# Patient Record
Sex: FEMALE | Race: WHITE | NOT HISPANIC OR LATINO | Employment: OTHER | URBAN - METROPOLITAN AREA
[De-identification: names, ages, dates, MRNs, and addresses within clinical notes are randomized per-mention and may not be internally consistent; named-entity substitution may affect disease eponyms.]

---

## 2019-08-07 LAB
EXTERNAL HEPATITIS B SURFACE ANTIGEN: NEGATIVE
EXTERNAL HEPATITIS C AB: NEGATIVE
EXTERNAL RUBELLA QUALITATIVE: NORMAL
EXTERNAL SYPHILIS RPR SCREEN: NEGATIVE
HIV1 P24 AG SERPL QL IA: NEGATIVE

## 2020-02-06 LAB — EXTERNAL GROUP B STREP ANTIGEN: NEGATIVE

## 2020-03-01 ENCOUNTER — HOSPITAL ENCOUNTER (INPATIENT)
Facility: HOSPITAL | Age: 35
LOS: 2 days | Discharge: HOME | End: 2020-03-03
Attending: OBSTETRICS & GYNECOLOGY | Admitting: OBSTETRICS & GYNECOLOGY

## 2020-03-01 PROBLEM — Z34.90 PREGNANT AND NOT YET DELIVERED: Status: ACTIVE | Noted: 2020-03-01

## 2020-03-01 PROBLEM — Z34.90 PREGNANT AND NOT YET DELIVERED: Status: RESOLVED | Noted: 2020-03-01 | Resolved: 2020-03-01

## 2020-03-01 LAB
ABO GROUP BLD: NORMAL
BLD GP AB SCN SERPL QL: NEGATIVE
DEPRECATED RDW RBC AUTO: 45.1 FL (ref 37–54)
ERYTHROCYTE [DISTWIDTH] IN BLOOD BY AUTOMATED COUNT: 13.9 % (ref 12.3–15.4)
HCT VFR BLD AUTO: 36.7 % (ref 34–46.6)
HGB BLD-MCNC: 12.9 G/DL (ref 12–15.9)
HIV1+2 AB SER QL: NORMAL
MCH RBC QN AUTO: 32.2 PG (ref 26.6–33)
MCHC RBC AUTO-ENTMCNC: 35.1 G/DL (ref 31.5–35.7)
MCV RBC AUTO: 91.7 FL (ref 79–97)
PLATELET # BLD AUTO: 172 10*3/MM3 (ref 140–450)
PMV BLD AUTO: 7.4 FL (ref 6–12)
RBC # BLD AUTO: 4.01 10*6/MM3 (ref 3.77–5.28)
RH BLD: POSITIVE
RPR SER QL: NORMAL
T&S EXPIRATION DATE: NORMAL
WBC NRBC COR # BLD: 11.6 10*3/MM3 (ref 3.4–10.8)

## 2020-03-01 PROCEDURE — 86901 BLOOD TYPING SEROLOGIC RH(D): CPT

## 2020-03-01 PROCEDURE — 86592 SYPHILIS TEST NON-TREP QUAL: CPT | Performed by: OBSTETRICS & GYNECOLOGY

## 2020-03-01 PROCEDURE — 86850 RBC ANTIBODY SCREEN: CPT | Performed by: OBSTETRICS & GYNECOLOGY

## 2020-03-01 PROCEDURE — 85027 COMPLETE CBC AUTOMATED: CPT | Performed by: OBSTETRICS & GYNECOLOGY

## 2020-03-01 PROCEDURE — 25010000003 LIDOCAINE 1 % SOLUTION

## 2020-03-01 PROCEDURE — G0432 EIA HIV-1/HIV-2 SCREEN: HCPCS | Performed by: OBSTETRICS & GYNECOLOGY

## 2020-03-01 PROCEDURE — 86900 BLOOD TYPING SEROLOGIC ABO: CPT

## 2020-03-01 PROCEDURE — 0KQM0ZZ REPAIR PERINEUM MUSCLE, OPEN APPROACH: ICD-10-PCS | Performed by: OBSTETRICS & GYNECOLOGY

## 2020-03-01 PROCEDURE — 86900 BLOOD TYPING SEROLOGIC ABO: CPT | Performed by: OBSTETRICS & GYNECOLOGY

## 2020-03-01 PROCEDURE — 86901 BLOOD TYPING SEROLOGIC RH(D): CPT | Performed by: OBSTETRICS & GYNECOLOGY

## 2020-03-01 PROCEDURE — 4A1HX4Z MONITORING OF PRODUCTS OF CONCEPTION, CARDIAC ELECTRICAL ACTIVITY, EXTERNAL APPROACH: ICD-10-PCS | Performed by: OBSTETRICS & GYNECOLOGY

## 2020-03-01 RX ORDER — SODIUM CHLORIDE, SODIUM LACTATE, POTASSIUM CHLORIDE, CALCIUM CHLORIDE 600; 310; 30; 20 MG/100ML; MG/100ML; MG/100ML; MG/100ML
125 INJECTION, SOLUTION INTRAVENOUS CONTINUOUS
Status: DISCONTINUED | OUTPATIENT
Start: 2020-03-01 | End: 2020-03-01

## 2020-03-01 RX ORDER — METHYLERGONOVINE MALEATE 0.2 MG/ML
200 INJECTION INTRAVENOUS ONCE AS NEEDED
Status: DISCONTINUED | OUTPATIENT
Start: 2020-03-01 | End: 2020-03-03 | Stop reason: HOSPADM

## 2020-03-01 RX ORDER — OXYTOCIN-SODIUM CHLORIDE 0.9% IV SOLN 30 UNIT/500ML 30-0.9/5 UT/ML-%
125 SOLUTION INTRAVENOUS CONTINUOUS PRN
Status: DISCONTINUED | OUTPATIENT
Start: 2020-03-01 | End: 2020-03-01

## 2020-03-01 RX ORDER — FERROUS SULFATE 325(65) MG
325 TABLET ORAL
COMMUNITY

## 2020-03-01 RX ORDER — PRENATAL VIT/IRON FUM/FOLIC AC 27MG-0.8MG
1 TABLET ORAL DAILY
Status: DISCONTINUED | OUTPATIENT
Start: 2020-03-01 | End: 2020-03-03 | Stop reason: HOSPADM

## 2020-03-01 RX ORDER — OXYTOCIN-SODIUM CHLORIDE 0.9% IV SOLN 30 UNIT/500ML 30-0.9/5 UT/ML-%
250 SOLUTION INTRAVENOUS CONTINUOUS
Status: DISCONTINUED | OUTPATIENT
Start: 2020-03-01 | End: 2020-03-01

## 2020-03-01 RX ORDER — SODIUM CHLORIDE 0.9 % (FLUSH) 0.9 %
1-10 SYRINGE (ML) INJECTION AS NEEDED
Status: DISCONTINUED | OUTPATIENT
Start: 2020-03-01 | End: 2020-03-01

## 2020-03-01 RX ORDER — MISOPROSTOL 200 UG/1
800 TABLET ORAL AS NEEDED
Status: DISCONTINUED | OUTPATIENT
Start: 2020-03-01 | End: 2020-03-03 | Stop reason: HOSPADM

## 2020-03-01 RX ORDER — CARBOPROST TROMETHAMINE 250 UG/ML
250 INJECTION, SOLUTION INTRAMUSCULAR AS NEEDED
Status: DISCONTINUED | OUTPATIENT
Start: 2020-03-01 | End: 2020-03-03 | Stop reason: HOSPADM

## 2020-03-01 RX ORDER — MAGNESIUM CARB/ALUMINUM HYDROX 105-160MG
30 TABLET,CHEWABLE ORAL ONCE
Status: DISCONTINUED | OUTPATIENT
Start: 2020-03-01 | End: 2020-03-01 | Stop reason: HOSPADM

## 2020-03-01 RX ORDER — DOCUSATE SODIUM 100 MG/1
100 CAPSULE, LIQUID FILLED ORAL 2 TIMES DAILY
Status: DISCONTINUED | OUTPATIENT
Start: 2020-03-01 | End: 2020-03-01

## 2020-03-01 RX ORDER — DOCUSATE SODIUM 100 MG/1
100 CAPSULE, LIQUID FILLED ORAL 2 TIMES DAILY
Status: DISCONTINUED | OUTPATIENT
Start: 2020-03-01 | End: 2020-03-03 | Stop reason: HOSPADM

## 2020-03-01 RX ORDER — LANOLIN 100 %
OINTMENT (GRAM) TOPICAL
Status: DISCONTINUED | OUTPATIENT
Start: 2020-03-01 | End: 2020-03-03 | Stop reason: HOSPADM

## 2020-03-01 RX ORDER — PRENATAL VIT NO.126/IRON/FOLIC 28MG-0.8MG
TABLET ORAL DAILY
COMMUNITY

## 2020-03-01 RX ORDER — SODIUM CHLORIDE 0.9 % (FLUSH) 0.9 %
10 SYRINGE (ML) INJECTION AS NEEDED
Status: DISCONTINUED | OUTPATIENT
Start: 2020-03-01 | End: 2020-03-01 | Stop reason: HOSPADM

## 2020-03-01 RX ORDER — ONDANSETRON 4 MG/1
4 TABLET, FILM COATED ORAL EVERY 8 HOURS PRN
Status: DISCONTINUED | OUTPATIENT
Start: 2020-03-01 | End: 2020-03-03 | Stop reason: HOSPADM

## 2020-03-01 RX ORDER — OXYTOCIN-SODIUM CHLORIDE 0.9% IV SOLN 30 UNIT/500ML 30-0.9/5 UT/ML-%
999 SOLUTION INTRAVENOUS ONCE
Status: DISCONTINUED | OUTPATIENT
Start: 2020-03-01 | End: 2020-03-01

## 2020-03-01 RX ORDER — IBUPROFEN 600 MG/1
600 TABLET ORAL EVERY 6 HOURS PRN
Status: DISCONTINUED | OUTPATIENT
Start: 2020-03-01 | End: 2020-03-03 | Stop reason: HOSPADM

## 2020-03-01 RX ORDER — LIDOCAINE HYDROCHLORIDE 10 MG/ML
INJECTION, SOLUTION INFILTRATION; PERINEURAL
Status: COMPLETED
Start: 2020-03-01 | End: 2020-03-01

## 2020-03-01 RX ORDER — OXYTOCIN-SODIUM CHLORIDE 0.9% IV SOLN 30 UNIT/500ML 30-0.9/5 UT/ML-%
SOLUTION INTRAVENOUS
Status: COMPLETED
Start: 2020-03-01 | End: 2020-03-01

## 2020-03-01 RX ADMIN — BENZOCAINE 57 SPRAY: 11.4 AEROSOL, SPRAY TOPICAL at 10:15

## 2020-03-01 RX ADMIN — IBUPROFEN 600 MG: 600 TABLET, FILM COATED ORAL at 22:21

## 2020-03-01 RX ADMIN — IBUPROFEN 600 MG: 600 TABLET, FILM COATED ORAL at 10:16

## 2020-03-01 RX ADMIN — OXYTOCIN: 10 INJECTION INTRAVENOUS at 08:48

## 2020-03-01 RX ADMIN — Medication: at 10:16

## 2020-03-01 RX ADMIN — WITCH HAZEL 1 PAD: 500 SOLUTION RECTAL; TOPICAL at 10:16

## 2020-03-01 RX ADMIN — IBUPROFEN 600 MG: 600 TABLET, FILM COATED ORAL at 16:18

## 2020-03-01 RX ADMIN — DOCUSATE SODIUM 100 MG: 100 CAPSULE, LIQUID FILLED ORAL at 20:21

## 2020-03-01 RX ADMIN — LIDOCAINE HYDROCHLORIDE: 10 INJECTION, SOLUTION INFILTRATION; PERINEURAL at 08:50

## 2020-03-01 NOTE — H&P
MAT Roland  Obstetric History and Physical     Chief Complaint: Contractions    Subjective     Patient is a 34 y.o. female  currently at 39w1d , who presents with painful regular contractions.    Her prenatal care is benign.  Her previous obstetric/gynecological history is noted for is non-contributory.      Prenatal Information:  Prenatal Results     POC Urine Glucose/Protein     Test Value Reference Range Date Time    Urine Glucose        Urine Protein              Initial Prenatal Labs     Test Value Reference Range Date Time    Hemoglobin        Hematocrit        Platelets 172 10*3/mm3 140 - 450 20 0632    Rubella IgG Immune   19     Hepatitis B SAg Negative   19     Hepatitis C Ab negative   19     RPR Negative   19     ABO        Rh        Antibody Screen        HIV Negative   19     Urine Culture        Gonorrhea        Chlamydia        TSH              2nd and 3rd Trimester     Test Value Reference Range Date Time    Hemoglobin (repeated) 12.9 g/dL 12.0 - 15.9 20 0632    Hematocrit (repeated) 36.7 % 34.0 - 46.6 20 0632    GCT        Antibody Screen (repeated)        GTT Fasting        GTT 1 Hr        GTT 2 Hr        GTT 3 Hr        Group B Strep Negative   20           Drug Screening     Test Value Reference Range Date Time    Amphetamine Screen        Barbiturate Screen        Benzodiazepine Screen        Methadone Screen        Phencyclidine Screen        Opiates Screen        THC Screen        Cocaine Screen        Propoxyphene Screen        Buprenorphine Screen        Methamphetamine Screen        Oxycodone Screen        Tricyclic Antidepressants Screen              Other (Risk screening)     Test Value Reference Range Date Time    Varicella IgG        Parvovirus IgG        CMV IgG        Cystic Fibrosis        Hemoglobin electrophoresis        NIPT        MSAFP-4        AFP (for NTD only)                  External Prenatal Results     Pregnancy  Outside Results - Transcribed From Office Records - See Scanned Records For Details     Test Value Date Time    Hgb 12.9 g/dL 20 0632    Hct 36.7 % 20 0632    ABO       Rh       Antibody Screen  NEGATIVE  18 1620    Glucose Fasting GTT       Glucose Tolerance Test 1 hour       Glucose Tolerance Test 3 hour       Gonorrhea (discrete)       Chlamydia (discrete)       RPR Negative  19     VDRL       Syphilis Antibody       Rubella Immune  19     HBsAg Negative  19     Herpes Simplex Virus PCR       Herpes Simplex VIrus Culture       HIV Negative  19     Hep C RNA Quant PCR       Hep C Antibody negative  19     AFP       Group B Strep Negative  20     GBS Susceptibility to Clindamycin       GBS Susceptibility to Erythromycin       Fetal Fibronectin       Genetic Testing, Maternal Blood             Drug Screening     Test Value Date Time    Urine Drug Screen       Amphetamine Screen NEGATIVE  18 1530    Barbiturate Screen NEGATIVE  18 1530    Benzodiazepine Screen NEGATIVE  18 1530    Methadone Screen NEGATIVE  18 1530    Phencyclidine Screen NEGATIVE  18 1530    Opiates Screen       THC Screen NEGATIVE  18 1530    Cocaine Screen       Propoxyphene Screen       Buprenorphine Screen       Methamphetamine Screen       Oxycodone Screen       Tricyclic Antidepressants Screen                    Past OB History:       OB History    Para Term  AB Living   2 1 1 0 0 1   SAB TAB Ectopic Molar Multiple Live Births   0 0 0 0 0 1               Past Medical History: No past medical history on file.     Past Surgical History No past surgical history on file.     Family History: No family history on file.   Social History:  has no tobacco history on file.   has no alcohol history on file.   has no drug history on file.        General ROS: Pertinent items are noted in HPI    Objective      Vitals:   There were no vitals filed for this  visit.    Fetal Heart Rate Assessment:   Category 1    [place]:   q 2     Physical Exam:     General Appearance:    Alert, cooperative, in no acute distress   Abdomen:     Soft, non-tender, no guarding, no rebound tenderness,       EFW 7 1/2 #.   Pelvic Exam:    Pelvis adequate.    Presentation: Vertex    Cervix: 7-8/90/-1 per RN   Extremities:   Moves all extremities well, no edema, no cyanosis, no              redness   Skin:   No bleeding, bruising or rash   Neurologic:   No focal neurologic defect          Laboratory Results:   Lab Results (last 48 hours)     Procedure Component Value Units Date/Time    HIV-1 Antibody, EIA [[id number]] Resulted:  08/07/19     Specimen:  Blood Updated:  03/01/20 0656     External HIV Antibody Negative    Group B Streptococcus Culture - Swab, Vaginal/Rectum [[id number]] Resulted:  02/06/20     Specimen:  Swab from Vaginal/Rectum Updated:  03/01/20 0656     External Strep Group B Ag Negative    Hepatitis C Antibody [[id number]] Resulted:  08/07/19     Specimen:  Blood Updated:  03/01/20 0656     External Hepatitis C Ab negative    Hepatitis B Surface Antigen [[id number]] Resulted:  08/07/19     Specimen:  Blood Updated:  03/01/20 0656     External Hepatitis B Surface Ag Negative    RPR [[id number]] Resulted:  08/07/19     Specimen:  Blood Updated:  03/01/20 0656     External RPR Negative    Rubella Antibody, IgG [[id number]] Resulted:  08/07/19     Specimen:  Blood Updated:  03/01/20 0656     External Rubella Qual Immune    CBC (No Diff) [[id number]]  (Abnormal) Collected:  03/01/20 0632    Specimen:  Blood Updated:  03/01/20 0647     WBC 11.60 10*3/mm3      RBC 4.01 10*6/mm3      Hemoglobin 12.9 g/dL      Hematocrit 36.7 %      MCV 91.7 fL      MCH 32.2 pg      MCHC 35.1 g/dL      RDW 13.9 %      RDW-SD 45.1 fl      MPV 7.4 fL      Platelets 172 10*3/mm3     RPR [[id number]] Collected:  03/01/20 0632    Specimen:  Blood Updated:  03/01/20 0643    HIV-1 & HIV-2 Antibodies [[id number]]  Collected:  03/01/20 0632    Specimen:  Blood Updated:  03/01/20 0643    Narrative:       The following orders were created for panel order HIV-1 & HIV-2 Antibodies.  Procedure                               Abnormality         Status                     ---------                               -----------         ------                     HIV-1 / O / 2 Ag / Antib...[[id number]]                      In process                   Please view results for these tests on the individual orders.    HIV-1 / O / 2 Ag / Antibody 4th Generation [[id number]] Collected:  03/01/20 0632    Specimen:  Blood Updated:  03/01/20 0643             Assessment/Plan     Active Problems:    Pregnant and not yet delivered         Assessment:  1.  Intrauterine pregnancy at 39w1d gestation with reactive fetal status.    2.  labor  without ROM   3.  GBS status:   External Strep Group B Ag   Date Value Ref Range Status   02/06/2020 Negative  Final       Plan:  1. Vaginal anticipated, analgesia with  nitrous oxide  2. Plan of care has been reviewed with patient.  3.  Risks, benefits of treatment plan have been discussed.  4.  All questions have been answered.         Sheila Mejia MD   3/1/2020   7:05 AM

## 2020-03-01 NOTE — L&D DELIVERY NOTE
[facility]  Vaginal Delivery Note    Diagnosis     Patient is a 34 y.o. female  currently at 39w1d, who presents with labor.      Delivery     Delivery:  Spontaneous Vaginal Delivery    Date of Delivery:  3/1/2020   Anesthesia: Nitrous   Delivering clinician: Sheila Mejia MD      Delivery narrative:  PT presented in labor at term.  She was 7-8 cm dilated on arrival.  She progressed on a normal labor curve with a reassuring fetal status throughout.  She pushed with excellent effort for just under 30 minutes to deliver a vigorous infant without difficulty.     Infant    Findings: VFI     Apgars: 9&9 at 1 and 5 minutes.      Placenta, Cord, and Fluid    Placenta delivered  spontaneous  3VC     Pitocin 30 U in 500cc bolus given at the time of placenta delivery.       Lacerations       had a 2nd degree laceration, which was repaired. with 3.0 Vicryl rapide in the usual fashion after anesthesia c 10cc 1% lidocaine.    Estimated Blood Loss 300cc     Complications  none    Disposition  Mother to Mother Baby/Postpartum  in stable condition currently.  Baby to remains with mom  in stable condition currently.      Sheila Mejia MD  20  9:21 AM

## 2020-03-02 LAB
BASOPHILS # BLD AUTO: 0 10*3/MM3 (ref 0–0.2)
BASOPHILS NFR BLD AUTO: 0.2 % (ref 0–1.5)
DEPRECATED RDW RBC AUTO: 45.5 FL (ref 37–54)
EOSINOPHIL # BLD AUTO: 0.1 10*3/MM3 (ref 0–0.4)
EOSINOPHIL NFR BLD AUTO: 0.8 % (ref 0.3–6.2)
ERYTHROCYTE [DISTWIDTH] IN BLOOD BY AUTOMATED COUNT: 14 % (ref 12.3–15.4)
HCT VFR BLD AUTO: 34.2 % (ref 34–46.6)
HGB BLD-MCNC: 11.6 G/DL (ref 12–15.9)
LYMPHOCYTES # BLD AUTO: 1.6 10*3/MM3 (ref 0.7–3.1)
LYMPHOCYTES NFR BLD AUTO: 15.8 % (ref 19.6–45.3)
MCH RBC QN AUTO: 31.6 PG (ref 26.6–33)
MCHC RBC AUTO-ENTMCNC: 33.9 G/DL (ref 31.5–35.7)
MCV RBC AUTO: 93.3 FL (ref 79–97)
MONOCYTES # BLD AUTO: 0.6 10*3/MM3 (ref 0.1–0.9)
MONOCYTES NFR BLD AUTO: 6.4 % (ref 5–12)
NEUTROPHILS # BLD AUTO: 7.7 10*3/MM3 (ref 1.7–7)
NEUTROPHILS NFR BLD AUTO: 76.8 % (ref 42.7–76)
NRBC BLD AUTO-RTO: 0 /100 WBC (ref 0–0.2)
PLATELET # BLD AUTO: 184 10*3/MM3 (ref 140–450)
PMV BLD AUTO: 7.4 FL (ref 6–12)
RBC # BLD AUTO: 3.67 10*6/MM3 (ref 3.77–5.28)
WBC NRBC COR # BLD: 10.1 10*3/MM3 (ref 3.4–10.8)

## 2020-03-02 PROCEDURE — 85025 COMPLETE CBC W/AUTO DIFF WBC: CPT | Performed by: OBSTETRICS & GYNECOLOGY

## 2020-03-02 RX ADMIN — DOCUSATE SODIUM 100 MG: 100 CAPSULE, LIQUID FILLED ORAL at 20:54

## 2020-03-02 RX ADMIN — IBUPROFEN 600 MG: 600 TABLET, FILM COATED ORAL at 08:53

## 2020-03-02 RX ADMIN — DOCUSATE SODIUM 100 MG: 100 CAPSULE, LIQUID FILLED ORAL at 08:53

## 2020-03-02 RX ADMIN — IBUPROFEN 600 MG: 600 TABLET, FILM COATED ORAL at 16:01

## 2020-03-02 RX ADMIN — IBUPROFEN 600 MG: 600 TABLET, FILM COATED ORAL at 23:39

## 2020-03-02 NOTE — PROGRESS NOTES
MAT Roland  Postpartum Note    Subjective   Postpartum Day 1:  Spontaneous Vaginal Delivery    Patient without complaints. Her pain is well controlled with nonsteroidal anti-inflammatory drugs. She is ambulating well.  Patient describes her bleeding as thin lochia.    Breastfeeding: without difficulty.    Objective     Vitals:  Vitals:    20 1210 20 1620 20 2250 20 0815   BP: 142/80 122/78 129/78 124/73   BP Location:   Right arm Right arm   Patient Position:   Sitting Sitting   Pulse: 95 95 73 71   Resp: 18 18 18 18   Temp: 98.7 °F (37.1 °C) 98.3 °F (36.8 °C) 98.6 °F (37 °C) 98.6 °F (37 °C)   TempSrc: Oral Oral Oral Oral   SpO2: 98% 98% 98% 99%   Weight:       Height:           Physical Exam:  General:  Alert and oriented x3. No acute distress.  Abdomen: abdomen is soft without significant tenderness, masses, organomegaly or guarding. Fundus: appropriate, firm, non tender  Incision: N/A  Skin: Warm, Dry  Extremities: Normal,  trace edema. Nontender     Labs:  Results from last 7 days   Lab Units 20  0735 20  0632   WBC 10*3/mm3 10.10 11.60*   HEMOGLOBIN g/dL 11.6* 12.9   HEMATOCRIT % 34.2 36.7   PLATELETS 10*3/mm3 184 172            Feeding method: Breastfeeding Status: Yes     Blood Type: RH Positive        Assessment/Plan     Principal Problem:     (normal spontaneous vaginal delivery)      Leslee Michel is Day 1  post-partum from a  Spontaneous Vaginal Delivery      Plan:  routine and continue present management.       LENCHO Kim  3/2/2020  12:35 PM

## 2020-03-02 NOTE — LACTATION NOTE
This note was copied from a baby's chart.  Pt denies hx of breast surgery, no allergy to wool or foods. Medela gel patches provided, instructed on use.  States she bf her 5 yo x 9 mo. child self weaned. She has a spectra pump. Teaching done.   Has had some difficulty feeding baby in lt side, assisted to position, latch feed baby well on both sides.  States she is pleased to see a good feeding, encouraged to call for help as needed. Declined bf dvd.

## 2020-03-02 NOTE — PLAN OF CARE
Patient's pain controlled with ibuprofen. Patient ambulating frequently and voiding appropriately.

## 2020-03-03 VITALS
HEIGHT: 64 IN | DIASTOLIC BLOOD PRESSURE: 76 MMHG | OXYGEN SATURATION: 98 % | BODY MASS INDEX: 28.04 KG/M2 | WEIGHT: 164.24 LBS | TEMPERATURE: 98.5 F | SYSTOLIC BLOOD PRESSURE: 126 MMHG | RESPIRATION RATE: 16 BRPM | HEART RATE: 72 BPM

## 2020-03-03 RX ORDER — IBUPROFEN 600 MG/1
600 TABLET ORAL EVERY 6 HOURS PRN
Qty: 60 TABLET | Refills: 1 | Status: SHIPPED | OUTPATIENT
Start: 2020-03-03

## 2020-03-03 RX ADMIN — DOCUSATE SODIUM 100 MG: 100 CAPSULE, LIQUID FILLED ORAL at 08:45

## 2020-03-03 RX ADMIN — BENZOCAINE 1 SPRAY: 11.4 AEROSOL, SPRAY TOPICAL at 09:51

## 2020-03-03 RX ADMIN — WITCH HAZEL 1 PAD: 500 SOLUTION RECTAL; TOPICAL at 09:01

## 2020-03-03 NOTE — LACTATION NOTE
This note was copied from a baby's chart.  Pt visited, states baby cluster fed last night, did not sleep until this morning.   States mild nipple tenderness, skin care products in use. Breasts filling.  Teaching complete. Plans d/c today. Will follow up as needed.

## 2020-03-03 NOTE — PAYOR COMM NOTE
"This is inpt maternity PA request for Leslee Michel--see attached PA form,  delivery information noted below.    AUTHORIZATION PENDING:   PLEASE CALL OR FAX DETERMINATION TO CONTACT BELOW. THANK YOU.     EL LIU RN  UTILIZATION REVIEW  [facility]  PH: [phone]  FX: [phone]    ====================  Vaginal delivery:     Verified inpt orders- 3/1   > delivery 3/1       MEETS IP CRITERIA PER MILLIMAN:  Vaginal Delivery  ORG: [id number] ([facility])   Operative Status Criteria  Inpatient    ====================  DELIVERY RECORD:    DELIVERY DATE/TIME: 3/1/20 @ 0844    GESTATIONAL AGE:   39+1  WKS    /PARA: 2/2    SEX: female    BIRTH Wt:  [birth year] GMS    LENGTH:   20.5   IN    APGARS:  9/9    TYPE: VAGINAL  =======================    Leslee Michel (34 y.o. Female)     Date of Birth Social Security Number Address Home Phone MRN    1985  [id number] [address] [phone] [id number]    Denominational Marital Status          None        Admission Date Admission Type Admitting Provider Attending Provider Department, Room/Bed    3/1/20 Elective Sheila Mejia MD Lewis, Heather Anne, MD [facility] MOTHER BABY, M420/    Discharge Date Discharge Disposition Discharge Destination                       Attending Provider:  Sheila Mejia MD    Allergies:  No Known Allergies    Isolation:  None   Infection:  None   Code Status:  CPR    Ht:  162.6 cm (64\")   Wt:  74.5 kg (164 lb 3.9 oz)    Admission Cmt:  None   Principal Problem:   (normal spontaneous vaginal delivery) [O80]                 Active Insurance as of 3/1/2020     Primary Coverage     Payor Plan Insurance Group Employer/Plan Group    [employer] [facility]     Payor Plan Address Payor Plan Phone Number Payor Plan Fax Number Effective Dates    PO    3/1/2020 - None Entered    [facility] [id number]       Subscriber Name Subscriber Birth Date Member ID       " ANTIONETTE MARIE 1985 [id number]                 Emergency Contacts      (Rel.) Home Phone Work Phone Mobile Phone    DELMAR MARIE (Spouse) [phone] -- [phone]               Operative/Procedure Notes (last 72 hours) (Notes from 20 0837 through 20 08)      Sheila Mejia MD at 20 0921          [facility]  Vaginal Delivery Note    Diagnosis     Patient is a 34 y.o. female  currently at 39w1d, who presents with labor.      Delivery     Delivery:  Spontaneous Vaginal Delivery    Date of Delivery:  3/1/2020   Anesthesia: Nitrous   Delivering clinician: Sheila Mejia MD      Delivery narrative:  PT presented in labor at term.  She was 7-8 cm dilated on arrival.  She progressed on a normal labor curve with a reassuring fetal status throughout.  She pushed with excellent effort for just under 30 minutes to deliver a vigorous infant without difficulty.     Infant    Findings: VFI     Apgars: 9&9 at 1 and 5 minutes.      Placenta, Cord, and Fluid    Placenta delivered  spontaneous  3VC     Pitocin 30 U in 500cc bolus given at the time of placenta delivery.       Lacerations       had a 2nd degree laceration, which was repaired. with 3.0 Vicryl rapide in the usual fashion after anesthesia c 10cc 1% lidocaine.    Estimated Blood Loss 300cc     Complications  none    Disposition  Mother to Mother Baby/Postpartum  in stable condition currently.  Baby to remains with mom  in stable condition currently.      Sheila Mejia MD  20  9:21 AM          Electronically signed by Sheila Mejia MD at 20 0922

## 2020-03-03 NOTE — PLAN OF CARE
Problem: Patient Care Overview  Goal: Plan of Care Review  Outcome: Ongoing (interventions implemented as appropriate)  Flowsheets (Taken 3/3/2020 0605)  Progress: improving  Plan of Care Reviewed With: patient; spouse  Outcome Summary: Pt pain well controlled with PO motrin. Vital signs and vaginal bleeding within normal limits.

## 2020-03-03 NOTE — DISCHARGE SUMMARY
[facility]  Delivery Discharge Summary    Primary OB Clinician: Sheila Mejia MD    Admission Diagnosis:  Principal Problem:     (normal spontaneous vaginal delivery)      Discharge Diagnosis:  Delivered    Gestational Age: 39w1d    Date of Delivery: 3/1/2020     Delivered By:  Sheila Mejia     Delivery Type: Vaginal, Spontaneous      Tubal Ligation: n/a    Intrapartum Course: Uncomplicated delivery.     Postpartum Course: Patient is a 34 y.o. female  currently at 39w1d, who presented in labor. Pt was admitted and underwent  Spontaneous Vaginal Delivery. Pt was transferred to PP where she had an uncomplicated course. Pt remained AFVSS, had scant lochia and pain was well controlled. Pt d/c home in stable condition and will f/u in office for PP visit as scheduled or PRN. Currently breastfeeding. Plans on none  for contraception.     Physical Exam:    Vitals:   Vitals:    20 1525 20 2238 20 2339 20 0810   BP: 137/72 120/76 112/73 126/76   BP Location: Right arm Right arm Right arm Right arm   Patient Position: Sitting Sitting Lying Lying   Pulse: 70 77 73 72   Resp: 16 18 18 16   Temp: 98.3 °F (36.8 °C) 98.3 °F (36.8 °C) 97.8 °F (36.6 °C) 98.5 °F (36.9 °C)   TempSrc: Oral Oral Oral Oral   SpO2: 97% 97% 97% 98%   Weight:       Height:         Temp (24hrs), Av.2 °F (36.8 °C), Min:97.8 °F (36.6 °C), Max:98.5 °F (36.9 °C)      General Appearance:    Alert, cooperative, in no acute distress   Abdomen:     Soft non-tender, non-distended, no guarding, no rebound         tenderness.   Extremities:   Moves all extremities well, no edema, no cyanosis, no              Redness.   Incision:  N/A   Fundus:   Firm, below umbilicus     Feeding method: Breastfeeding Status: Yes    Labs:  Results from last 7 days   Lab Units 20  0735 20  0632   WBC 10*3/mm3 10.10 11.60*   HEMOGLOBIN g/dL 11.6* 12.9   HEMATOCRIT % 34.2 36.7   PLATELETS 10*3/mm3 184 172           Blood Type: RH  Positive      Plan:  Discharge to home.    Follow-up appointment with Dr Mejia in 6 week.    Jennifer Dowell NP  3/3/2020  9:31 AM